# Patient Record
Sex: MALE | Race: WHITE
[De-identification: names, ages, dates, MRNs, and addresses within clinical notes are randomized per-mention and may not be internally consistent; named-entity substitution may affect disease eponyms.]

---

## 2019-11-22 ENCOUNTER — HOSPITAL ENCOUNTER (EMERGENCY)
Dept: HOSPITAL 76 - ED | Age: 24
Discharge: HOME | End: 2019-11-22
Payer: COMMERCIAL

## 2019-11-22 VITALS — SYSTOLIC BLOOD PRESSURE: 130 MMHG | DIASTOLIC BLOOD PRESSURE: 88 MMHG

## 2019-11-22 DIAGNOSIS — B30.9: Primary | ICD-10-CM

## 2019-11-22 DIAGNOSIS — J06.9: ICD-10-CM

## 2019-11-22 DIAGNOSIS — E10.9: ICD-10-CM

## 2019-11-22 PROCEDURE — 87070 CULTURE OTHR SPECIMN AEROBIC: CPT

## 2019-11-22 PROCEDURE — 99283 EMERGENCY DEPT VISIT LOW MDM: CPT

## 2019-11-22 PROCEDURE — 87430 STREP A AG IA: CPT

## 2019-11-22 NOTE — ED PHYSICIAN DOCUMENTATION
PD HPI URI





- Stated complaint


Stated Complaint: FEVER/SORE THROAT





- Chief complaint


Chief Complaint: Heent





- History obtained from


History obtained from: Patient





- History of Present Illness


Timing - onset: How many days ago (3-4)


Timing duration: Days


Timing details: Gradual onset, Still present


Associated symptoms: Fever (lower at 100.), Nasal congestion, Sore throat, 

Swollen nodes, Dry cough, Other (today with both eyes red and puffy, with mild 

discharge.).  No: Chills, NVD


Contributing factors: No: Sick contact, Travel, Immunocompromised


Similar symptoms before: Has not had sx before





Review of Systems


Constitutional: reports: Fever, Chills, Myalgias


Eyes: reports: Discharge, Irritation.  denies: Loss of vision, Photophobia


Nose: reports: Rhinorrhea / runny nose, Congestion


Throat: reports: Sore throat


Respiratory: reports: Cough


GI: denies: Abdominal Pain, Nausea, Vomiting, Diarrhea


Skin: denies: Rash


Neurologic: reports: Generalized weakness.  denies: Difficulty speaking, 

Confused, Altered mental status, Headache





PD PAST MEDICAL HISTORY





- Past Medical History


Cardiovascular: None


Respiratory: None


Neuro: None


Endocrine/Autoimmune: Type 1 diabetes





- Past Surgical History


Past Surgical History: No





- Present Medications


Home Medications: 


                                Ambulatory Orders











 Medication  Instructions  Recorded  Confirmed


 


Insulin Aspart [Novolog] 1 unit SQ TID 01/18/16 01/18/16


 


Insulin Glargine,Hum.rec.anlog 47 units SQ DAILY 01/18/16 01/18/16





[Lantus Solostar]   


 


Cetirizine [ZyrTEC] 10 mg PO DAILY #15 tablet 11/22/19 


 


Ketorolac 0.45% Ophth Drops 1 drops EACHEYE QID #1 bottle 11/22/19 





[Acuvail]   


 


dexAMETHasone [Decadron] 4 mg PO DAILY #5 tablet 11/22/19 














- Allergies


Allergies/Adverse Reactions: 


                                    Allergies











Allergy/AdvReac Type Severity Reaction Status Date / Time


 


No Known Drug Allergies Allergy   Verified 11/22/19 13:16














- Social History


Does the pt smoke?: No


Smoking Status: Never smoker


Does the pt have substance abuse?: No





PD ED PE NORMAL





- Vitals


Vital signs reviewed: Yes





- General


General: Alert and oriented X 3, No acute distress, Well developed/nourished





- HEENT


HEENT: PERRL, EOMI (bilateral general conjunctival redness with mild 

inflammation. No discharge noted at this time. ), Moist mucous membranes, 

Pharynx benign





- Neck


Neck: Supple, no meningeal sign, Other (mild anterior adenopathy.)





- Cardiac


Cardiac: RRR, No murmur





- Respiratory


Respiratory: Clear bilaterally





- Derm


Derm: Normal color, Warm and dry, No rash





- Neuro


Neuro: Alert and oriented X 3, No motor deficit, Normal speech





Results





- Vitals


Vitals: 


                               Vital Signs - 24 hr











  11/22/19





  13:16


 


Temperature 36.8 C


 


Heart Rate 113 H


 


Respiratory 17





Rate 


 


Blood Pressure 130/88 H


 


O2 Saturation 99








                                     Oxygen











O2 Source                      Room air

















- Labs


Labs: 


                                  Microbiology











 11/22/19 13:20 Group A Strep Throat Culture - Final





 Throat    Beta Hemolytic Strep Group G








                                Laboratory Tests











  11/22/19





  13:20


 


Group A Strep Rapid  Negative














PD MEDICAL DECISION MAKING





- ED course


Complexity details: considered differential (has URI symptoms and developed 

redness/mild discharge both eyes; seems likely part of the viral illness. ), d/w

patient





Departure





- Departure


Disposition:  Home, Self Care


Clinical Impression: 


 Conjunctivitis, viral





Upper respiratory infection


Qualifiers:


 URI type: unspecified URI Qualified Code(s): J06.9 - Acute upper respiratory 

infection, unspecified





Condition: Stable


Record reviewed to determine appropriate education?: Yes


Instructions:  ED Upper Resp Infec No  Abx Tx


Prescriptions: 


Cetirizine [ZyrTEC] 10 mg PO DAILY #15 tablet


dexAMETHasone [Decadron] 4 mg PO DAILY #5 tablet


Ketorolac 0.45% Ophth Drops [Acuvail] 1 drops EACHEYE QID #1 bottle


Comments: 


This sounds like a viral illness including the irritation of the eyes.  We can 

try to help the inflammation in the airways and sinuses with some anti-

inflammatory and also can help with the eye redness and inflammation with some 

eyedrop anti-inflammatories.  See the prescriptions.  Otherwise can try some 

antihistamine to decrease congestion so cetirizine daily.  Stay well-hydrated.  

Tylenol if needed for fevers.  Off work for a couple of days due to the acute 

illness.


Forms:  Activity restrictions


Discharge Date/Time: 11/22/19 16:36